# Patient Record
Sex: FEMALE | Race: WHITE | NOT HISPANIC OR LATINO | Employment: OTHER | URBAN - METROPOLITAN AREA
[De-identification: names, ages, dates, MRNs, and addresses within clinical notes are randomized per-mention and may not be internally consistent; named-entity substitution may affect disease eponyms.]

---

## 2023-12-19 ENCOUNTER — OFFICE VISIT (OUTPATIENT)
Dept: ORTHOPEDIC SURGERY | Facility: CLINIC | Age: 68
End: 2023-12-19
Payer: MEDICARE

## 2023-12-19 VITALS
HEIGHT: 66 IN | SYSTOLIC BLOOD PRESSURE: 140 MMHG | BODY MASS INDEX: 29.47 KG/M2 | WEIGHT: 183.4 LBS | DIASTOLIC BLOOD PRESSURE: 82 MMHG

## 2023-12-19 DIAGNOSIS — G89.29 CHRONIC RIGHT SHOULDER PAIN: Primary | ICD-10-CM

## 2023-12-19 DIAGNOSIS — Z98.890 HISTORY OF REVERSE TOTAL REPLACEMENT OF RIGHT SHOULDER JOINT: ICD-10-CM

## 2023-12-19 DIAGNOSIS — T84.038A LOOSENING OF SHOULDER JOINT PROSTHESIS, INITIAL ENCOUNTER: ICD-10-CM

## 2023-12-19 DIAGNOSIS — Z96.619 LOOSENING OF SHOULDER JOINT PROSTHESIS, INITIAL ENCOUNTER: ICD-10-CM

## 2023-12-19 DIAGNOSIS — M25.511 CHRONIC RIGHT SHOULDER PAIN: Primary | ICD-10-CM

## 2023-12-19 RX ORDER — LEVOTHYROXINE SODIUM 0.03 MG/1
25 TABLET ORAL DAILY
COMMUNITY

## 2023-12-19 RX ORDER — POTASSIUM CHLORIDE 20 MEQ/1
20 TABLET, EXTENDED RELEASE ORAL
COMMUNITY

## 2023-12-19 RX ORDER — PROPRANOLOL HYDROCHLORIDE 40 MG/1
40 TABLET ORAL 2 TIMES DAILY
COMMUNITY

## 2023-12-19 RX ORDER — SOTALOL HYDROCHLORIDE 80 MG/1
80 TABLET ORAL
COMMUNITY

## 2023-12-19 RX ORDER — EPINEPHRINE 0.3 MG/.3ML
INJECTION SUBCUTANEOUS
COMMUNITY
Start: 2023-09-26

## 2023-12-19 RX ORDER — BUDESONIDE 0.5 MG/2ML
INHALANT ORAL
COMMUNITY
Start: 2023-11-16

## 2023-12-19 RX ORDER — BUMETANIDE 2 MG/1
TABLET ORAL
COMMUNITY

## 2023-12-19 RX ORDER — ZOLPIDEM TARTRATE 10 MG/1
10 TABLET ORAL
COMMUNITY

## 2023-12-19 RX ORDER — PANTOPRAZOLE SODIUM 40 MG/1
40 TABLET, DELAYED RELEASE ORAL DAILY
COMMUNITY

## 2023-12-19 RX ORDER — MONTELUKAST SODIUM 10 MG/1
10 TABLET ORAL DAILY
COMMUNITY

## 2023-12-19 RX ORDER — SPIRONOLACTONE 50 MG/1
50 TABLET, FILM COATED ORAL 2 TIMES DAILY
COMMUNITY

## 2023-12-19 RX ORDER — TIZANIDINE 4 MG/1
TABLET ORAL
COMMUNITY

## 2023-12-19 NOTE — PROGRESS NOTES
[facility] Note    Subjective     Chief Complaint   Patient presents with   • Right Shoulder - Pain        HPI  Trang Carpenter is a 68 y.o. female.  Right-hand-dominant. New patient presents for evaluation of right shoulder pain, failed rTSA.  Symptoms/pain have been ongoing since 2021. Reports symptoms/pain started when she had a cardiac cath procedure and they entered through her right radial artery.      rTSA: Dr. Rory Saeed in [facility] 3/2012.    Pain scale: 8-10+/10.  Severity of the pain severe.  Quality of the pain stabbing, shooting.  Associated symptoms pain, stiffness, giving away/buckling.  Activity related to pain any movement (movement very limited), any jostle to right upper extremity.  Pain eased by nothing. Prior treatment     Was referred to Dr. Guevara secondary to multiple medical issues: cirrhosis of the liver, NSAH, heart disease, GI hemorrhage, esophageal varices (history esophageal banding), DM. Followed by liver transplant team but not on transplant list.    Reports being on chronic Cipro--reason unknown.    Had CT scan performed by Dr. Saeed but no imaging available to review. Has Joint aspiration performed 10/3/2023 but no culture results in Epic.    Denies fever, chills, night sweats or other constitutional symptoms.    Past Medical History:   Diagnosis Date   • A-fib    • Cirrhosis of liver    • Heart disease    • Pulmonary disease    • Type 2 diabetes mellitus       Past Surgical History:   Procedure Laterality Date   • APPENDECTOMY     • ENDOSCOPY     • FOOT SURGERY     • GALLBLADDER SURGERY     • HYSTERECTOMY     • JOINT REPLACEMENT Right     reverse TSA   • LIVER SURGERY        Family History   Problem Relation Age of Onset   • Cancer Father    • Cancer Maternal Grandmother    • Cancer Daughter      Social History     Socioeconomic History   • Marital status:    Tobacco Use   • Smoking status: Never   • Smokeless tobacco: Never   Vaping Use   •  "Vaping Use: Never used   Substance and Sexual Activity   • Alcohol use: Not Currently   • Drug use: Defer   • Sexual activity: Defer      Current Outpatient Medications on File Prior to Visit   Medication Sig Dispense Refill   • budesonide (PULMICORT) 0.5 MG/2ML nebulizer solution      • bumetanide (BUMEX) 2 MG tablet TAKE 1 TABLET TWO TIMES DAILY     • EPINEPHrine (EPIPEN) 0.3 MG/0.3ML solution auto-injector injection SMARTSI.3 Milliliter(s) IM Once PRN     • Insulin Glargine w/ Trans Port 100 UNIT/ML solution pen-injector Inject 65 Units under the skin into the appropriate area as directed.     • levothyroxine (SYNTHROID, LEVOTHROID) 25 MCG tablet Take 1 tablet by mouth Daily.     • montelukast (SINGULAIR) 10 MG tablet Take 1 tablet by mouth Daily.     • pantoprazole (PROTONIX) 40 MG EC tablet Take 1 tablet by mouth Daily.     • potassium chloride (K-DUR,KLOR-CON) 20 MEQ CR tablet Take 1 tablet by mouth.     • propranolol (INDERAL) 40 MG tablet Take 1 tablet by mouth 2 (Two) Times a Day.     • sotalol (BETAPACE) 80 MG tablet Take 1 tablet by mouth.     • spironolactone (ALDACTONE) 50 MG tablet Take 1 tablet by mouth 2 (Two) Times a Day.     • tiZANidine (ZANAFLEX) 4 MG tablet TAKE ONE-HALF tablet AT bedtime     • zolpidem (AMBIEN) 10 MG tablet Take 1 tablet by mouth every night at bedtime.       No current facility-administered medications on file prior to visit.      Allergies   Allergen Reactions   • Bee Venom Anaphylaxis   • Cephalexin Anaphylaxis   • Diphenhydramine Anaphylaxis   • Levofloxacin Anaphylaxis   • Penicillins Anaphylaxis, Hives, Rash and Shortness Of Breath   • Codeine Nausea And Vomiting and Unknown (See Comments)     Other reaction(s): Unknown - Patient states they do not know rxn details   • Ibuprofen Other (See Comments) and Unknown (See Comments)     \"ATE MY STOMACH UP, CAUSED MULTIPLE ULCERS\"   • Hydrocodone-Acetaminophen Rash     SOB   SOB    SOB   • Morphine Other (See Comments)   • " "Prednisone Other (See Comments) and Unknown (See Comments)     Other reaction(s): Other (See Comments), Unknown - Patient states they do not know rxn details   HALLUCINATIONS   HALLUCINATIONS    HALLUCINATIONS        The following portions of the patient's history were reviewed and updated as appropriate: allergies, current medications, past family history, past medical history, past social history, past surgical history, and problem list.    Review of Systems   Constitutional: Negative.    HENT: Negative.     Eyes: Negative.    Respiratory: Negative.     Cardiovascular: Negative.    Gastrointestinal: Negative.    Endocrine: Negative.    Genitourinary: Negative.    Musculoskeletal:  Positive for arthralgias.   Skin: Negative.    Allergic/Immunologic: Negative.    Neurological: Negative.    Hematological: Negative.    Psychiatric/Behavioral: Negative.          Objective      Physical Exam  /82   Ht 166.4 cm (65.5\")   Wt 83.2 kg (183 lb 6.4 oz)   BMI 30.06 kg/m²     Body mass index is 30.06 kg/m².    GENERAL APPEARANCE: awake, alert & oriented x 3, in no acute distress and well developed, well nourished  PSYCH: normal mood and affect  LUNGS:  breathing nonlabored, no wheezing  EYES: sclera anicteric, pupils equal  CARDIOVASCULAR: palpable pulses. Capillary refill less than 2 seconds  INTEGUMENTARY: skin intact, no clubbing, cyanosis  NEUROLOGIC:  Normal Sensation         Ortho Exam  Right Shoulder  Loss of normal shoulder contour.  Skin: Intact without any erythema, warmth or swelling. Prior surgical incision site well healed.  Tenderness: Global/diffuse pain throughout shoulder.  Motion: Holds arm at side, elbow flexed 90 degrees and resting on abdomen. Passive FF 15, Abd <15, ER (elbows at side) <15.  Impingement: Munira Nj unable to test.    Rotator cuff: Anya/Empty can unable to test. Drop arm unable to test. Lift-off/modified lift-off unable to test.   Deltoid: 3-/5  Motor: Grossly intact to " Ax/MSC/R/U/M/AIN/PIN  Sensory: Grossly intact to Ax/MSC/R/U/M nerve distributions.  Vascular: 2+ radial pulse with brisk capillary refill into each digit.    FROM elbow, wrist, digits.      Imaging/Studies  Ordered right shoulder and bilateral humerus plain films.  Imaging read/interpreted by Dr. Guevara.    Imaging Results (Last 7 Days)       Procedure Component Value Units Date/Time    XR Humerus 2 View Bilateral [[id number]] Resulted: 12/19/23 1335     Updated: 12/19/23 1336    Narrative:      Imaging: humerus x-rays 2 views - AP of both the left and right humerus -   bilateral templated views    Side: bilateral xrays for comparison    Indication for humerus 2 views: shoulder pain, limb length comparison    Comparison: bilateral comparison views were completed; bilateral   comparison views were need to assess for limb length discrepancy,   templated films were performed    Findings: The unaffected side was critical for a comparison film for limb   length evaluation and the affected side showed a central screw baseplate   with loosening and superior migration on the glenoid side.  Lateral bone   loss on the midportion of the humeral stem as well.    I personally reviewed the above x-rays.       XR Shoulder 2+ View Right [[id number]] Resulted: 12/19/23 1333     Updated: 12/19/23 1335    Narrative:      Imaging: shoulder x-rays 3 views - AP, axillary, and scapular-Y x-ray   views    Side: RIGHT SHOULDER    Indication for shoulder x-ray 3 views: shoulder pain    Comparison: humeral imaging    Findings:   RIGHT shoulder 3 views shows outside hospital reverse shoulder   arthroplasty in place.  Central screw baseplate shows loosening and   superior migration on the glenoid side.  Lateral bone loss on the   midportion of the humeral stem as well.    I personally reviewed the above x-rays.          Laboratory data 10/2/2023  WBC 4.3, CRP 1.15, ESR 41, Cell count   Appearance  Clear, Hazy Cloudy Abnormal    Color Pink   BODY  FLUID TYPE Synovial   Polymorphonuclear Cells%  % 33.3   Polymorphonuclear Cells#  u/L 0.472   Mononuclear Cells%  % 66.7    Mononuclear Cells#  u/L 0.944   Auto WBC/Nucleated Cells BF  /uL 1,416     No culture data available.  Assessment/Plan        ICD-10-CM ICD-9-CM   1. Chronic right shoulder pain  M25.511 719.41    G89.29 338.29   2. Loosening of shoulder joint prosthesis, initial encounter  T84.038A 996.41    Z96.619 V43.61   3. 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  -Reviewed imaging.  -Will need culture data/labs--our clinic and patient will work on getting. If culture data not available then will need to repeat aspiration--if this happens, patient will need to contact whomever is prescribing the Cipro and find out if she can be off the medication for 2 weeks prior to aspiration.  -Need CT imaging (actual images) to review.  -Need OP note from Dr. Saeed's surgery in 2012--will have our clinic request this information.  -Patient will need surgical clearances from liver transplant team, GI team, medical team--to start working on those now.  -Recommend OTC pain medication as needed.  -Follow up with Dr. Guevara after all information obtained--to discuss treatment options.   -Questions and concerns answered.      Patient was also examined by Dr. Guevara and he agrees with the above assessment and plan.      Medical Decision Making  Management Options : over-the-counter medicine  Data/Risk: lab tests, radiology tests, and decision to obtain old records      Marisa Neff PA-C  12/22/23  11:04 EST               EMR Dragon/Transcription disclaimer:  Much of this encounter note is an electronic transcription of spoken language to  printed text. Electronic transcription of spoken language may permit erroneous, or at times, nonsensical words or phrases to be inadvertently transcribed. Although I have reviewed the note for such errors, some may still exist.

## 2023-12-21 ENCOUNTER — TELEPHONE (OUTPATIENT)
Dept: ORTHOPEDIC SURGERY | Facility: CLINIC | Age: 68
End: 2023-12-21

## 2023-12-22 DIAGNOSIS — Z98.890 HISTORY OF REVERSE TOTAL REPLACEMENT OF RIGHT SHOULDER JOINT: ICD-10-CM

## 2023-12-22 DIAGNOSIS — T84.038A LOOSENING OF SHOULDER JOINT PROSTHESIS, INITIAL ENCOUNTER: ICD-10-CM

## 2023-12-22 DIAGNOSIS — Z96.619 LOOSENING OF SHOULDER JOINT PROSTHESIS, INITIAL ENCOUNTER: ICD-10-CM

## 2023-12-22 DIAGNOSIS — G89.29 CHRONIC RIGHT SHOULDER PAIN: Primary | ICD-10-CM

## 2023-12-22 DIAGNOSIS — M25.511 CHRONIC RIGHT SHOULDER PAIN: Primary | ICD-10-CM
